# Patient Record
Sex: FEMALE | Race: WHITE
[De-identification: names, ages, dates, MRNs, and addresses within clinical notes are randomized per-mention and may not be internally consistent; named-entity substitution may affect disease eponyms.]

---

## 2018-05-21 ENCOUNTER — HOSPITAL ENCOUNTER (OUTPATIENT)
Dept: HOSPITAL 47 - EC | Age: 48
Setting detail: OBSERVATION
LOS: 2 days | Discharge: HOME | End: 2018-05-23
Payer: COMMERCIAL

## 2018-05-21 DIAGNOSIS — Z79.899: ICD-10-CM

## 2018-05-21 DIAGNOSIS — E66.9: ICD-10-CM

## 2018-05-21 DIAGNOSIS — R07.89: Primary | ICD-10-CM

## 2018-05-21 DIAGNOSIS — Z88.0: ICD-10-CM

## 2018-05-21 DIAGNOSIS — Z82.49: ICD-10-CM

## 2018-05-21 DIAGNOSIS — Z83.3: ICD-10-CM

## 2018-05-21 DIAGNOSIS — E78.1: ICD-10-CM

## 2018-05-21 DIAGNOSIS — Z82.3: ICD-10-CM

## 2018-05-21 DIAGNOSIS — Z86.79: ICD-10-CM

## 2018-05-21 DIAGNOSIS — E55.9: ICD-10-CM

## 2018-05-21 DIAGNOSIS — Z87.01: ICD-10-CM

## 2018-05-21 DIAGNOSIS — K21.9: ICD-10-CM

## 2018-05-21 DIAGNOSIS — I11.9: ICD-10-CM

## 2018-05-21 DIAGNOSIS — Z87.891: ICD-10-CM

## 2018-05-21 DIAGNOSIS — Z80.0: ICD-10-CM

## 2018-05-21 DIAGNOSIS — Z87.820: ICD-10-CM

## 2018-05-21 DIAGNOSIS — E78.5: ICD-10-CM

## 2018-05-21 DIAGNOSIS — Z80.8: ICD-10-CM

## 2018-05-21 LAB
ALBUMIN SERPL-MCNC: 4.4 G/DL (ref 3.5–5)
ALP SERPL-CCNC: 87 U/L (ref 38–126)
ALT SERPL-CCNC: 44 U/L (ref 9–52)
AMYLASE SERPL-CCNC: 65 U/L (ref 30–110)
ANION GAP SERPL CALC-SCNC: 15 MMOL/L
APTT BLD: 25.1 SEC (ref 22–30)
AST SERPL-CCNC: 25 U/L (ref 14–36)
BASOPHILS # BLD AUTO: 0 K/UL (ref 0–0.2)
BASOPHILS NFR BLD AUTO: 0 %
BUN SERPL-SCNC: 11 MG/DL (ref 7–17)
CALCIUM SPEC-MCNC: 10 MG/DL (ref 8.4–10.2)
CHLORIDE SERPL-SCNC: 106 MMOL/L (ref 98–107)
CK SERPL-CCNC: 139 U/L (ref 30–135)
CK SERPL-CCNC: 181 U/L (ref 30–135)
CO2 SERPL-SCNC: 21 MMOL/L (ref 22–30)
D DIMER PPP FEU-MCNC: 0.31 MG/L FEU (ref ?–0.6)
EOSINOPHIL # BLD AUTO: 0.2 K/UL (ref 0–0.7)
EOSINOPHIL NFR BLD AUTO: 3 %
ERYTHROCYTE [DISTWIDTH] IN BLOOD BY AUTOMATED COUNT: 4.49 M/UL (ref 3.8–5.4)
ERYTHROCYTE [DISTWIDTH] IN BLOOD: 12.4 % (ref 11.5–15.5)
GLUCOSE SERPL-MCNC: 120 MG/DL (ref 74–99)
HCT VFR BLD AUTO: 38.6 % (ref 34–46)
HGB BLD-MCNC: 13.2 GM/DL (ref 11.4–16)
INR PPP: 1 (ref ?–1.2)
LIPASE SERPL-CCNC: 94 U/L (ref 23–300)
LYMPHOCYTES # SPEC AUTO: 2.2 K/UL (ref 1–4.8)
LYMPHOCYTES NFR SPEC AUTO: 32 %
MAGNESIUM SPEC-SCNC: 1.9 MG/DL (ref 1.6–2.3)
MCH RBC QN AUTO: 29.5 PG (ref 25–35)
MCHC RBC AUTO-ENTMCNC: 34.2 G/DL (ref 31–37)
MCV RBC AUTO: 86.1 FL (ref 80–100)
MONOCYTES # BLD AUTO: 0.3 K/UL (ref 0–1)
MONOCYTES NFR BLD AUTO: 4 %
NEUTROPHILS # BLD AUTO: 4.1 K/UL (ref 1.3–7.7)
NEUTROPHILS NFR BLD AUTO: 60 %
PLATELET # BLD AUTO: 237 K/UL (ref 150–450)
POTASSIUM SERPL-SCNC: 3.7 MMOL/L (ref 3.5–5.1)
PROT SERPL-MCNC: 6.9 G/DL (ref 6.3–8.2)
PT BLD: 9.8 SEC (ref 9–12)
SODIUM SERPL-SCNC: 142 MMOL/L (ref 137–145)
TROPONIN I SERPL-MCNC: <0.012 NG/ML (ref 0–0.03)
TROPONIN I SERPL-MCNC: <0.012 NG/ML (ref 0–0.03)
WBC # BLD AUTO: 6.8 K/UL (ref 3.8–10.6)

## 2018-05-21 PROCEDURE — 83880 ASSAY OF NATRIURETIC PEPTIDE: CPT

## 2018-05-21 PROCEDURE — 78452 HT MUSCLE IMAGE SPECT MULT: CPT

## 2018-05-21 PROCEDURE — 84484 ASSAY OF TROPONIN QUANT: CPT

## 2018-05-21 PROCEDURE — 96376 TX/PRO/DX INJ SAME DRUG ADON: CPT

## 2018-05-21 PROCEDURE — 80061 LIPID PANEL: CPT

## 2018-05-21 PROCEDURE — 83735 ASSAY OF MAGNESIUM: CPT

## 2018-05-21 PROCEDURE — 96366 THER/PROPH/DIAG IV INF ADDON: CPT

## 2018-05-21 PROCEDURE — 82150 ASSAY OF AMYLASE: CPT

## 2018-05-21 PROCEDURE — 93005 ELECTROCARDIOGRAM TRACING: CPT

## 2018-05-21 PROCEDURE — 99285 EMERGENCY DEPT VISIT HI MDM: CPT

## 2018-05-21 PROCEDURE — 82553 CREATINE MB FRACTION: CPT

## 2018-05-21 PROCEDURE — 96375 TX/PRO/DX INJ NEW DRUG ADDON: CPT

## 2018-05-21 PROCEDURE — 93017 CV STRESS TEST TRACING ONLY: CPT

## 2018-05-21 PROCEDURE — 85379 FIBRIN DEGRADATION QUANT: CPT

## 2018-05-21 PROCEDURE — 82550 ASSAY OF CK (CPK): CPT

## 2018-05-21 PROCEDURE — 83690 ASSAY OF LIPASE: CPT

## 2018-05-21 PROCEDURE — 84443 ASSAY THYROID STIM HORMONE: CPT

## 2018-05-21 PROCEDURE — 85610 PROTHROMBIN TIME: CPT

## 2018-05-21 PROCEDURE — 85025 COMPLETE CBC W/AUTO DIFF WBC: CPT

## 2018-05-21 PROCEDURE — 85730 THROMBOPLASTIN TIME PARTIAL: CPT

## 2018-05-21 PROCEDURE — 36415 COLL VENOUS BLD VENIPUNCTURE: CPT

## 2018-05-21 PROCEDURE — 71046 X-RAY EXAM CHEST 2 VIEWS: CPT

## 2018-05-21 PROCEDURE — 93306 TTE W/DOPPLER COMPLETE: CPT

## 2018-05-21 PROCEDURE — 96365 THER/PROPH/DIAG IV INF INIT: CPT

## 2018-05-21 PROCEDURE — 80053 COMPREHEN METABOLIC PANEL: CPT

## 2018-05-21 RX ADMIN — CEFAZOLIN SCH MLS/HR: 330 INJECTION, POWDER, FOR SOLUTION INTRAMUSCULAR; INTRAVENOUS at 20:16

## 2018-05-21 NOTE — ED
Chest Pain HPI





- General


Chief Complaint: Chest Pain


Stated Complaint: Chest pain


Time Seen by Provider: 18 15:50


Source: patient, RN notes reviewed


Mode of arrival: ambulatory


Limitations: no limitations





- History of Present Illness


Initial Comments: 





This is a 47-year-old female history of hypertension and high cholesterol and 

family history of heart disease who presents with complaints of the onset 3 

days ago of right-sided chest pain sharp in nature 3-4/10 severity now rating 

for the middle of her chest.  She points to the right costal sternal margin.  

She is not recall any heavy lifting and may have precipitated again the pain is 

sharp is been intermittent since 3 days ago today she complains of feeling 

tired all day.  No cough or phlegm production again no overt injury.  No 

personal history of heart disease she did have a physical exam done in March of 

this year family history significant for mother had a myocardial infarction at 

the age of 60 admitted had a AAA diagnosed at age 66.  Patient does not smoke 

cigarettes.


MD Complaint: chest pain





- Related Data


 Home Medications











 Medication  Instructions  Recorded  Confirmed


 


Ascorbic Acid [Vitamin C] 500 mg PO DAILY 18


 


Atorvastatin Calcium [Lipitor] 10 mg PO DAILY 18


 


Biotin 10,000 mcg PO DAILY 18


 


Cholecalciferol (Vitamin D3) 2,000 unit PO DAILY 18





[Vitamin D3]   


 


Escitalopram [Lexapro] 10 mg PO DAILY 18


 


L.acidoph,Paracasei, B.lactis 1 cap PO DAILY 18





[Probiotic]   


 


Lisinopril [Prinivil] 20 mg PO DAILY 18











 Allergies











Allergy/AdvReac Type Severity Reaction Status Date / Time


 


Penicillins Allergy  Unknown Verified 18 16:35














Review of Systems


ROS Statement: 


Those systems with pertinent positive or pertinent negative responses have been 

documented in the HPI.





ROS Other: All systems not noted in ROS Statement are negative.





EKG Findings





- EKG Results:


EKG: interpreted by ROSANNA, sinus rhythm (Sinus rhythm with a rate of 74.  

Interval 144 QRS duration 84 QT since QTC of 390/432 poor R-wave progression 

this does compare however with an EKG dated 3/20/18 and also does correlate 

with one done at work earlier today.)





Past Medical History


Past Medical History: Hypertension


Additional Past Medical History / Comment(s): hypercholestremia


History of Any Multi-Drug Resistant Organisms: None Reported


Past Surgical History: Appendectomy


Additional Past Surgical History / Comment(s): partial hysterectomy.   

x 2


Past Psychological History: No Psychological Hx Reported


Smoking Status: Never smoker


Past Alcohol Use History: Occasional


Past Drug Use History: None Reported





General Exam





- General Exam Comments


Initial Comments: 





This is a well-developed well-nourished awake alert oriented 3 female


Limitations: no limitations


General appearance: alert, in no apparent distress


Head exam: Present: atraumatic, normocephalic, normal inspection


Eye exam: Present: normal appearance, PERRL, EOMI.  Absent: scleral icterus, 

conjunctival injection, periorbital swelling


ENT exam: Present: normal exam, mucous membranes moist


Neck exam: Present: normal inspection.  Absent: tenderness, meningismus, 

lymphadenopathy


Respiratory exam: Present: normal lung sounds bilaterally.  Absent: respiratory 

distress, wheezes, rales, rhonchi, stridor


Cardiovascular Exam: Present: regular rate, normal rhythm, normal heart sounds.

  Absent: systolic murmur, diastolic murmur, rubs, gallop, clicks


GI/Abdominal exam: Present: soft, normal bowel sounds.  Absent: distended, 

tenderness, guarding, rebound, rigid


Extremities exam: Present: normal inspection, full ROM, normal capillary 

refill.  Absent: tenderness, pedal edema, joint swelling, calf tenderness


Back exam: Present: normal inspection


Neurological exam: Present: alert, oriented X3, CN II-XII intact


Psychiatric exam: Present: normal affect, normal mood


Skin exam: Present: warm, dry, intact, normal color.  Absent: rash





Course


 Vital Signs











  18





  16:00 17:25 17:33


 


Temperature 98.4 F  


 


Pulse Rate 75 71 66


 


Pulse Rate [ 63  





Bilateral   





Cardiac Monitor   





]   


 


Respiratory 18 18 16





Rate   


 


Blood Pressure 143/81 135/74 125/70


 


O2 Sat by Pulse 96 97 96





Oximetry   














- Reevaluation(s)


Reevaluation #1: 





18 18:47


The patient persisted having 3/10 chest pain after IV Toradol.  Resolve after 

nitroglycerin.





Chest Pain MDM





- [facility]





Imaging shows no acute findings.  I did discuss findings with patient and her 

.  Patient will be admitted for evaluation of chest pain.  Patient has 

seen Dr. Hinojosa in the past he'll be consulted.  This case with Dr. Hutson





Disposition


Clinical Impression: 


 Unstable angina pectoris, Chest pain





Disposition: ADMITTED AS IP TO THIS HOSP


Condition: Stable


Referrals: 


Nestor Gusman MD [Primary Care Provider] - 1-2 days

## 2018-05-21 NOTE — XR
EXAMINATION TYPE: XR chest 2V

 

DATE OF EXAM: 5/21/2018

 

COMPARISON: NONE

 

HISTORY: Chest pain

 

TECHNIQUE:  Frontal and lateral views of the chest are obtained.

 

FINDINGS:  

 

There is no focal air space opacity.

 

No evidence for pneumothorax.  No pleural effusion.

 

The cardiac silhouette size is within normal limits.

 

The osseous structures are grossly intact.

 

IMPRESSION:  

 

1.  No acute cardiopulmonary process.

## 2018-05-22 VITALS — RESPIRATION RATE: 16 BRPM

## 2018-05-22 LAB
CHOLEST SERPL-MCNC: 174 MG/DL (ref ?–200)
CK SERPL-CCNC: 134 U/L (ref 30–135)
HDLC SERPL-MCNC: 39 MG/DL (ref 40–60)
TRIGL SERPL-MCNC: 690 MG/DL (ref ?–150)
TROPONIN I SERPL-MCNC: <0.012 NG/ML (ref 0–0.03)

## 2018-05-22 RX ADMIN — NITROGLYCERIN SCH: 20 OINTMENT TOPICAL at 06:20

## 2018-05-22 RX ADMIN — CEFAZOLIN SCH: 330 INJECTION, POWDER, FOR SOLUTION INTRAMUSCULAR; INTRAVENOUS at 22:13

## 2018-05-22 RX ADMIN — NITROGLYCERIN SCH: 20 OINTMENT TOPICAL at 02:41

## 2018-05-22 NOTE — EST
EXERCISE STRESS



DATE OF SERVICE:

05/22/2018



AGE:

47



SEX:

Female



HT:

64



WT:

230



PROTOCOL:

Sebastien



STAGE:

II



DURATION OF EXERCISE:

6 minutes



HEART RATE REST:

81



BLOOD PRESSURE REST:

162/111



MAXIMUM HEART RATE ACHIEVED:

158



MAXIMUM BLOOD PRESSURE:

265/83



85% MPHR:

148



100% MPHR:

174



METS:

7.3



INDICATION OF THE STUDY:

Chest pain.



CLINICAL INFORMATION:

STRESS DATA:  Pretesting physical examination showed a heart rate of 81, pressure is

162/111 mmHg.  Baseline EKG showed sinus mechanism.  The patient exercised on the

treadmill according to Sebastien protocol for a total of 6 minutes and achieved 7.3 METs.

Max heart rate was 158 which is about 91% of maximum predicted heart rate.  Maximum

blood pressure was 265/83 mmHg. Clinically the patient developed chest discomfort at

the peak of the exercise as well as on recovery.  The EKG did not show any significant

ST or T-wave abnormalities consistent with ischemia.



CONCLUSION:

1. Good exercise tolerance.

2. Chest discomfort in response to exercise.

3. Mildly abnormal stress test in view of the chest discomfort at the peak of the

    exercise as well as on recovery.





MMODL / IJN: [id number] / Job#: [id number]

## 2018-05-22 NOTE — P.HPIM
History of Present Illness


H&P Date: 18


Chief Complaint: Chest pain





HISTORY AND PHYSICAL AND DISCHARGE SUMMARY: 








This is a 47-year-old  female patient of Dr. Gusman with past medical 

history of hyperlipidemia, gastroesophageal reflux disease, hypertension.  She 

also gives history that she had palpitations while she was pregnant and 

followed with Dr. Hinojosa at that time.  She has had a previous stress test that 

was negative.  Patient gives history that she has had chest pain the right 

sternal border for a couple of days.  No radiation to her arms.  Yesterday she 

woke up with the pain which seemed to be worse.  She denies any increased pain 

with deep breathing.  No worsening pain with exertion.  She does state she was 

sweaty at the time.  No nausea.  She came in to [facility] 

emergency center for evaluation.  Avoidance of been negative on 3 draws.  

Triglycerides 690, cholesterol 174, she'll 39.  Normal white count, hemoglobin 

13.2, amylase and lipase were normal.  Blood sugar was 120.  TSH 2.930.  EKG 

was a sinus rhythm with no acute ST changes.  Patient was started on aspirin, 

heparin drip and placed on the observation unit with consult with cardiology.  

Patient is scheduled for exercise stress echocardiogram.  Patient believes pain 

is gone at this time because of taking nitroglycerin.





Echocardiogram reveals mild concentric left ventricular hypertrophy, EF 50-55%, 

trace mitral regurgitation, trace tricuspid regurgitation.





Review of Systems


All systems: negative


Constitutional: Denies anorexia, Denies chills, Denies fatigue, Denies fever, 

Denies poor appetite, Denies weakness, Denies weight gain, Denies weight loss


Eyes: denies blurred vision, denies pain


Ears, nose, mouth and throat: Denies headache, Denies sore throat, Denies 

vertigo


Cardiovascular: Denies chest pain, Denies decreased exercise tolerance, Denies 

dyspnea on exertion, Denies edema, Denies leg edema, Denies lightheadedness, 

Denies orthopnea, Denies palpitations, Denies paroxysmal nocturnal dyspnea, 

Denies rapid heart beat, Denies shortness of breath, Denies syncope


Respiratory: Denies cough, Denies cough with sputum, Denies dyspnea, Denies 

excessive sputum, Denies wheezing


Gastrointestinal: Denies abdominal pain, Denies diarrhea, Denies nausea, Denies 

vomiting


Genitourinary: Denies dysuria, Denies hematuria


Musculoskeletal: Denies frequent falls, Denies gait dysfunction, Denies myalgias


Integumentary: Denies pruritus, Denies rash, Denies wounds


Neurological: Denies confusion, Denies numbness, Denies weakness


Psychiatric: Denies anxiety, Denies depression


Endocrine: Reports excessive sweating, Denies fatigue, Denies weight change





Past Medical History


Past Medical History: GERD/Reflux, Hyperlipidemia, Hypertension, Pneumonia


Additional Past Medical History / Comment(s): Palpitations w/pregnancies, 

concussion d/t fall on ice in 2018


History of Any Multi-Drug Resistant Organisms: None Reported


Past Surgical History: Appendectomy


Additional Past Surgical History / Comment(s): partial hysterectomy.   

x 2


Past Anesthesia/Blood Transfusion Reactions: Motion Sickness, Postoperative 

Nausea & Vomiting (PONV)


Smoking Status: Former smoker


Additional Past Alcohol Use History / Comment(s): Patient was a smoker of one 

pack per day from age 14 and quit in .  She drink alcohol occasionally.  

She denies any marijuana or street drug use.  She is  and lives at home 

with her .  She works [employer].





- Past Family History


  ** Father


Family Medical History: Cancer, Hyperlipidemia, Hypertension


Additional Family Medical History / Comment(s): Father  at age 75 from 

pancreatic cancer.  AAA,





  ** Mother


Family Medical History: Cancer, CVA/TIA, Diabetes Mellitus, Hyperlipidemia, 

Hypertension, Myocardial Infarction (MI), Thyroid Disorder


Additional Family Medical History / Comment(s): Mother  from C. difficile 

colitis, history of CVA X2,THYROID CANCER, BRAIN ANUERYSM





  ** Brother(s)


Additional Family Medical History / Comment(s): Patient has 2 brothers and 1 

sister.  One brother and one sister have atrial fibrillation.  Now 100 artery 

disease.  Patient has 2 children with no major medical problems.





Medications and Allergies


 Home Medications











 Medication  Instructions  Recorded  Confirmed  Type


 


Ascorbic Acid [Vitamin C] 500 mg PO DAILY 18 History


 


Atorvastatin Calcium [Lipitor] 10 mg PO DAILY 18 History


 


Biotin 10,000 mcg PO DAILY 18 History


 


Cholecalciferol (Vitamin D3) 2,000 unit PO DAILY 18 History





[Vitamin D3]    


 


Escitalopram [Lexapro] 10 mg PO DAILY 18 History


 


L.acidoph,Paracasei, B.lactis 1 cap PO DAILY 18 History





[Probiotic]    


 


Lisinopril [Prinivil] 20 mg PO DAILY 18 History











 Allergies











Allergy/AdvReac Type Severity Reaction Status Date / Time


 


Penicillins Allergy  Unknown Verified 18 16:35














Physical Exam


Vitals: 


 Vital Signs











  Temp Pulse Pulse Pulse Resp BP BP


 


 18 07:30  98.0 F    63  18   112/57


 


 18 04:00    60   16  


 


 18 03:50  98.1 F   60   16   92/50


 


 18 00:00      16  


 


 18 23:28  98.0 F   65   16   138/62


 


 18 21:12  97.9 F   69   16   128/56


 


 18 20:54  98.2 F  61    18  133/60 


 


 18 20:39   61    16  129/72 


 


 18 19:46   70    18  130/72 


 


 18 18:46  97.8 F  65    16  132/77 


 


 18 18:16  97.6 F  66    16  126/78 


 


 18 17:46  97.8 F  68    18  134/74 


 


 18 17:33   66    16  125/70 


 


 18 17:25   71    18  135/74 


 


 18 16:00  98.4 F  75  63   18  143/81 














  Pulse Ox


 


 18 07:30  97


 


 18 04:00 


 


 18 03:50  98


 


 18 00:00 


 


 18 23:28  97


 


 18 21:12  97


 


 18 20:54  98


 


 18 20:39  98


 


 18 19:46  98


 


 18 18:46  99


 


 18 18:16  99


 


 18 17:46  98


 


 18 17:33  96


 


 18 17:25  97


 


 18 16:00  96








 Intake and Output











 18





 22:59 06:59 14:59


 


Intake Total  189.333 


 


Balance  189.333 


 


Intake:   


 


  Intake, IV Titration  189.333 





  Amount   


 


    Heparin Sodium,Porcine/  189.333 





    D5w Pmx 25,000 unit In   





    Dextrose/Water 1 500ml.   





    bag @ 9.586 UNITS/KG/HR   





    20 mls/hr IV .Q24H [facility] Rx   





    #:[id number]   


 


Other:   


 


  Voiding Method  Toilet 


 


  # Voids  1 


 


  Weight 104.326 kg  

















Gen: This is a 47-year-old obese  female.  She is resting in bed on 

the observation unit and appears to be comfortable and in no acute distress.  

No respiratory distress noted.


HEENT: Head is atraumatic, normocephalic. Pupils equal, round. Sclerae is 

anicteric.  Conjunctiva pink.  Mucous members of the mouth are moist.


NECK: Supple. No JVD. No lymphadenopathy. No thyromegaly. 


LUNGS: Clear to auscultation. No wheezes or rhonchi.  No intercostal 

retractions.


HEART: Regular rate and rhythm. No murmur.  No chest tenderness with palpation.


ABDOMEN: Soft. Bowel sounds are present. No masses.  No tenderness.


EXTREMITIES: No pedal edema.  No calf tenderness.  Dorsalis pedis +2 

bilaterally.


NEUROLOGICAL: Patient is awake, alert and oriented x3. Cranial nerves 2 through 

12 are grossly intact. 








Results


CBC & Chem 7: 


 18 16:15





 18 16:15


Labs: 


 Abnormal Lab Results - Last 24 Hours (Table)











  18 Range/Units





  16:15 16:15 22:37 


 


Carbon Dioxide  21 L    (22-30)  mmol/L


 


Glucose  120 H    (74-99)  mg/dL


 


Total Creatine Kinase   181 H  139 H  ()  U/L


 


Triglycerides     (<150)  mg/dL


 


HDL Cholesterol     (40-60)  mg/dL














  18 Range/Units





  03:03 


 


Carbon Dioxide   (22-30)  mmol/L


 


Glucose   (74-99)  mg/dL


 


Total Creatine Kinase   ()  U/L


 


Triglycerides  690 H  (<150)  mg/dL


 


HDL Cholesterol  39 L  (40-60)  mg/dL














Thrombosis Risk Factor Assmnt





- DVT/VTE Prophylaxis


DVT/VTE Prophylaxis: Pharmacologic Prophylaxis ordered





- Choose All That Apply


Each Factor Represents 1 point: Age 41-60 years, Obesity (BMI >25)


Thrombosis Risk Factor Assessment Total Risk Factor Score: 2


Thrombosis Risk Factor Assessment Level: Low Risk





Assessment and Plan


Plan: 





1.  Chest pain with negative troponins.  Exercise stress echocardiogram.  

Cardiology consult appreciated.  Chest pain possibly related to gastric reflux 

and or musculoskeletal pain.  Patient will be placed on Aleve and omeprazole 

for 2 weeks





2.  Hyperlipidemia.  Continue atorvastatin, hypertriglyceridemia.





3.  Hypertension.  Continue lisinopril 20 mg daily.





4.  Gastroesophageal reflux disease.





5.  Vitamin D deficiency.  Continue supplement.





Patient placed on the observation unit.





Discharge plan: Return home





Impression and plan of care have been directed as dictated by the signing 

physician.  Sallie Machado nurse practitioner acting as scribe for signing 

physician.

## 2018-05-22 NOTE — ECHOF
Referral Reason:Chest pain



MEASUREMENTS

--------

HEIGHT: 162.6 cm

WEIGHT: 104.3 kg

BP: 138/62

IVSd:   1.3 cm     (0.6 - 1.1)

LVIDd:   3.4 cm     (3.9 - 5.3)

LVPWd:   1.2 cm     (0.6 - 1.1)

IVSs:   1.8 cm

LVIDs:   1.6 cm

LVPWs:   1.8 cm

Ao Diam:   3.2 cm     (2.0 - 3.7)

AV Cusp:   2.2 cm     (1.5 - 2.6)

LA Diam:   3.7 cm     (2.7 - 3.8)

MV EXCURSION:   14.577 mm     (> 18.000)

MV EF SLOPE:   130 mm/s     (70 - 150)

EPSS:   0.3 cm

MV E Rao:   0.78 m/s

MV DecT:   166 ms

MV A Rao:   0.54 m/s

MV E/A Ratio:   1.44 

RAP:   5.00 mmHg

RVSP:   22.41 mmHg







FINDINGS

--------

Sinus rhythm.

This was a technically difficult study with suboptimal views.

The left ventricular size is normal.   There is mild concentric left ventricular hypertrophy.   Overa
ll left ventricular systolic function is low-normal with, an EF between 50 - 55 %.

The right ventricle is normal in size and function.

The left atrium is normal in size.

The right atrium is normal in size.

Lumason used

The aortic valve is trileaflet, and appears structurally normal. No aortic stenosis or regurgitation.


Mild mitral annular calcification present.   There is trace mitral regurgitation.

Trace tricuspid regurgitation present.   The right ventricular systolic pressure, as measured by Dopp
ler, is 22.41mmHg.

Pulmonic valve appears structurally normal.

The aortic root size is normal.

The pericardium is normal.



CONCLUSIONS

--------

1. Sinus rhythm.

2. This was a technically difficult study with suboptimal views.

3. The left ventricular size is normal.

4. There is mild concentric left ventricular hypertrophy.

5. Overall left ventricular systolic function is low-normal with, an EF between 50 - 55 %.

6. The right ventricle is normal in size and function.

7. The left atrium is normal in size.

8. The right atrium is normal in size.

9. Lumason used

10. The aortic valve is trileaflet, and appears structurally normal. No aortic stenosis or regurgitat
ion.

11. Mild mitral annular calcification present.

12. There is trace mitral regurgitation.

13. Trace tricuspid regurgitation present.

14. The right ventricular systolic pressure, as measured by Doppler, is 22.41mmHg.

15. Pulmonic valve appears structurally normal.

16. The aortic root size is normal.

17. The pericardium is normal.





SONOGRAPHER: Adali Turk RDCS

## 2018-05-22 NOTE — P.CRDCN
History of Present Illness


History of present illness: 


Mrs. Ruiz is a pleasant 47-year-old  female past medical history 

significant for hypertension, gastroesophageal reflux disease, supraventricular 

tachycardia with pregnancies, former tobacco use and dyslipidemia.  She denies 

history of coronary artery disease.  We have been asked to see her in 

consultation for chest pain. She states she has had pain to the right anterior 

chest wall that started on Saturday, it was intermittent in nature with no 

radiation to arm, back, neck or jaw. No associated symptoms such as shortness 

of breath, nausea, vomiting, dizziness, palpitations or diaphoresis. Pain 

persisted intermittently on  again with no specific aggravating or 

alleviating factors. When she woke up Monday morning the pain was again evident 

and  persisted all day. She decided to come to hospital for evaluation. Once 

she arrived she was given SL nitroglycerin x1 and the pain has resolved 

completely. No reoccurrence of pain since admission. 





EKG reveals sinus mechanism with no acute ST or T-wave abnormalities.


Chest x-ray is negative for an acute cardiopulmonary process.


Laboratory data reviewed, hemoglobin 13.2, platelets 237, d-dimer 0.31, sodium 

142, potassium 3.7, magnesium 1.9, creatinine 0.63, cardiac enzymes negative 3

, triglycerides 690, HDL 39, total cholesterol 174, TSH 2.93.


Current cardiac medications include atorvastatin 10 mg daily, lisinopril 20 mg 

daily.  She also takes probiotic, vitamin D, biotin, vitamin C and Lexapro.





Review of Systems





At the time of my exam:


CONSTITUTIONAL: Denies fever. Denies chills.


EYES: Denies blurred vision. Denies vision changes. Denies eye pain.


EARS, NOSE, MOUTH & THROAT: Denies headache. Denies sore throat. Denies ear 

pain.


CARDIOVASCULAR: Denies chest pain. Denies shortness of breath. Denies 

orthopnea. Denies PND. Denies palpitations.


RESPIRATORY: Denies cough. 


GASTROINTESTINAL: Denies abdominal pain. Denies diarrhea. Denies constipation. 

Denies nausea. Denies vomiting.


MUSCULOSKELETAL: Denies myalgias.


INTEGUMENTARY: Denies pruitis. Denies rash.


NEUROLOGIC: Denies numbness. Denies tingling. Denies weakness.


PSYCHIATRIC: Denies anxiety. Denies depression.


ENDOCRINE: Denies fatigue. Denies weight change. Denies polydipsia. Denies 

polyurina.


GENITOURINARY: Denies burning, hematuria or urgency with micturation.


HEMATOLOGIC: Denies history of anemia. Denies bleeding. 








Past Medical History


Past Medical History: GERD/Reflux, Hypertension, Pneumonia


Additional Past Medical History / Comment(s): hypercholestremia.  in past w/

pregnancies had palpitations, concussion d/t fall on ice in 2018


History of Any Multi-Drug Resistant Organisms: None Reported


Past Surgical History: Appendectomy


Additional Past Surgical History / Comment(s): partial hysterectomy.   

x 2


Past Anesthesia/Blood Transfusion Reactions: Motion Sickness, Postoperative 

Nausea & Vomiting (PONV)


Smoking Status: Former smoker





- Past Family History


  ** Father


Family Medical History: Cancer, Hyperlipidemia, Hypertension


Additional Family Medical History / Comment(s): AAA,





  ** Mother


Family Medical History: Cancer, CVA/TIA, Diabetes Mellitus, Hyperlipidemia, 

Hypertension, Myocardial Infarction (MI), Thyroid Disorder


Additional Family Medical History / Comment(s): CVA X2,THYROID CANCER, BRAIN 

ANUERYSM





Medications and Allergies


 Home Medications











 Medication  Instructions  Recorded  Confirmed  Type


 


Ascorbic Acid [Vitamin C] 500 mg PO DAILY 18 History


 


Atorvastatin Calcium [Lipitor] 10 mg PO DAILY 18 History


 


Biotin 10,000 mcg PO DAILY 18 History


 


Cholecalciferol (Vitamin D3) 2,000 unit PO DAILY 18 History





[Vitamin D3]    


 


Escitalopram [Lexapro] 10 mg PO DAILY 18 History


 


L.acidoph,Paracasei, B.lactis 1 cap PO DAILY 18 History





[Probiotic]    


 


Lisinopril [Prinivil] 20 mg PO DAILY 18 History











 Allergies











Allergy/AdvReac Type Severity Reaction Status Date / Time


 


Penicillins Allergy  Unknown Verified 18 16:35














Physical Exam


Vitals: 


 Vital Signs











  Temp Pulse Pulse Pulse Resp BP BP


 


 18 07:30  98.0 F    63  18   112/57


 


 18 04:00    60   16  


 


 18 03:50  98.1 F   60   16   92/50


 


 18 00:00      16  


 


 18 23:28  98.0 F   65   16   138/62


 


 18 21:12  97.9 F   69   16   128/56


 


 18 20:54  98.2 F  61    18  133/60 


 


 18 20:39   61    16  129/72 


 


 18 19:46   70    18  130/72 


 


 18 18:46  97.8 F  65    16  132/77 


 


 18 18:16  97.6 F  66    16  126/78 


 


 18 17:46  97.8 F  68    18  134/74 


 


 18 17:33   66    16  125/70 


 


 18 17:25   71    18  135/74 


 


 18 16:00  98.4 F  75  63   18  143/81 














  Pulse Ox


 


 18 07:30  97


 


 18 04:00 


 


 18 03:50  98


 


 18 00:00 


 


 18 23:28  97


 


 18 21:12  97


 


 18 20:54  98


 


 18 20:39  98


 


 18 19:46  98


 


 18 18:46  99


 


 18 18:16  99


 


 18 17:46  98


 


 18 17:33  96


 


 18 17:25  97


 


 18 16:00  96








 Intake and Output











 18





 22:59 06:59 14:59


 


Intake Total  189.333 


 


Balance  189.333 


 


Intake:   


 


  Intake, IV Titration  189.333 





  Amount   


 


    Heparin Sodium,Porcine/  189.333 





    D5w Pmx 25,000 unit In   





    Dextrose/Water 1 500ml.   





    bag @ 9.586 UNITS/KG/HR   





    20 mls/hr IV .Q24H [facility] Rx   





    #:[id number]   


 


Other:   


 


  Voiding Method  Toilet 


 


  # Voids  1 


 


  Weight 104.326 kg  














Blood pressure 112/57 heart rate 63 afebrile maintaining oxygen saturation on 

room air


GENERAL: This is a 47-year-old  female in no apparent distress at the 

time of my examination.  Obese.


HEENT: Head is atraumatic, normocephalic. Pupils are equal, round. Sclerae 

anicteric. Conjunctivae are clear. Mucous membranes of the mouth are moist. 

Neck is supple. There is no jugular venous distention. No carotid bruit is 

heard.


LUNGS: Clear to auscultation no wheezes, rales or rhonchi. No chest wall 

tenderness is noted on palpation or with deep breathing.


HEART: Regular rate and rhythm without murmurs, rubs or gallops. S1 and S2 

heard.


ABDOMEN: Soft, nontender. Bowel sounds are heard. No organomegaly noted.


EXTREMITIES: No evidence of peripheral edema and no calf tenderness noted.


VASCULAR: Radial and dorsalis pedis pulses palpated, no evidence of clubbing.  


NEUROLOGIC: Patient is awake, alert and oriented x3.


 








Results





 18 16:15





 18 16:15


 Cardiac Enzymes











  18 Range/Units





  16:15 16:15 22:37 


 


AST  25    (14-36)  U/L


 


CK-MB (CK-2)   1.8  1.2  (0.0-2.4)  ng/mL


 


Troponin I   <0.012  <0.012  (0.000-0.034)  ng/mL














  18 Range/Units





  03:03 


 


AST   (14-36)  U/L


 


CK-MB (CK-2)  1.1  (0.0-2.4)  ng/mL


 


Troponin I  <0.012  (0.000-0.034)  ng/mL








 Coagulation











  18 Range/Units





  16:15 03:03 


 


PT  9.8   (9.0-12.0)  sec


 


APTT  25.1  27.3  (22.0-30.0)  sec








 Lipids











  18 Range/Units





  03:03 


 


Triglycerides  690 H  (<150)  mg/dL


 


Cholesterol  174  (<200)  mg/dL


 


HDL Cholesterol  39 L  (40-60)  mg/dL








 CBC











  18 Range/Units





  16:15 


 


WBC  6.8  (3.8-10.6)  k/uL


 


RBC  4.49  (3.80-5.40)  m/uL


 


Hgb  13.2  (11.4-16.0)  gm/dL


 


Hct  38.6  (34.0-46.0)  %


 


Plt Count  237  (150-450)  k/uL








 Comprehensive Metabolic Panel











  18 Range/Units





  16:15 


 


Sodium  142  (137-145)  mmol/L


 


Potassium  3.7  (3.5-5.1)  mmol/L


 


Chloride  106  ()  mmol/L


 


Carbon Dioxide  21 L  (22-30)  mmol/L


 


BUN  11  (7-17)  mg/dL


 


Creatinine  0.63  (0.52-1.04)  mg/dL


 


Glucose  120 H  (74-99)  mg/dL


 


Calcium  10.0  (8.4-10.2)  mg/dL


 


AST  25  (14-36)  U/L


 


ALT  44  (9-52)  U/L


 


Alkaline Phosphatase  87  ()  U/L


 


Total Protein  6.9  (6.3-8.2)  g/dL


 


Albumin  4.4  (3.5-5.0)  g/dL








 Current Medications











Generic Name Dose Route Start Last Admin





  Trade Name Freq  PRN Reason Stop Dose Admin


 


Ascorbic Acid  500 mg  18 09:00  





  Vitamin C  PO   





  DAILY [facility]   


 


Aspirin  325 mg  18 09:00  





  Aspirin  PO   





  DAILY [facility]   


 


Atorvastatin Calcium  10 mg  18 09:00  





  Lipitor  PO   





  DAILY [facility]   


 


Cholecalciferol  2,000 unit  18 09:00  





  Vitamin D3  PO   





  DAILY [facility]   


 


Escitalopram Oxalate  10 mg  18 09:00  





  Lexapro  PO   





  DAILY [facility]   


 


Heparin Sodium (Porcine)  0 unit  18 05:14  18 05:38





  Heparin  IV   4,000 unit





  PER PROTOCOL PRN   Administration





  Low PTT   





  Protocol   


 


Heparin Sodium/Dextrose 25,000  500 mls @ 20 mls/hr  18 18:45  18 05

:40





  unit/ IV Solution  IV   12.6 units/kg/hr





  .Q24H [facility]   26.3 mls/hr





  Protocol   Titration





  9.586 UNITS/KG/HR   


 


Sodium Chloride  1,000 mls @ 20 mls/hr  18 18:45  18 20:16





  Saline 0.9%  IV   20 mls/hr





  .Q24H [facility]   Administration


 


Lactobacillus Acidoph/Bulgaricus  1 each  18 09:00  





  Lactinex  PO   





  DAILY [facility]   


 


Lisinopril  20 mg  18 09:00  





  Zestril  PO   





  DAILY [facility]   


 


Nitroglycerin  1 inch  18 00:00  18 06:20





  Nitro-Bid Oint  TOPICAL   Not Given





  Q6HR [facility]   


 


Nitroglycerin  0.4 mg  05/21/18 18:42  





  Nitrostat  SUBLINGUAL   





  Q5M PRN   





  Chest Pain   








 Intake and Output











 18





 22:59 06:59 14:59


 


Intake Total  189.333 


 


Balance  189.333 


 


Intake:   


 


  Intake, IV Titration  189.333 





  Amount   


 


    Heparin Sodium,Porcine/  189.333 





    D5w Pmx 25,000 unit In   





    Dextrose/Water 1 500ml.   





    bag @ 9.586 UNITS/KG/HR   





    20 mls/hr IV .Q24H [facility] Rx   





    #:[id number]   


 


Other:   


 


  Voiding Method  Toilet 


 


  # Voids  1 


 


  Weight 104.326 kg  








 





 18 16:15 





 18 16:15 











Assessment and Plan


Assessment: 





ASSESSMENT


1.  Chest pain, atypical.  An acute coronary event has been ruled out with no 

EKG evidence of ischemia and normal cardiac enzymes.


2.  Hypertension, controlled on lisinopril


3.  Dyslipidemia on atorvastatin


4.  Hypertriglyceridemia


5.  Obesity





PLAN


An acute coronary event has been ruled out.


Perform exercise stress test to assess for stress-induced EKG changes.


Stress test is normal she is stable from a cardiac perspective.


Follow-up with Dr. Barnett in 2-3 weeks.


Thank you kindly for this consultation.





Nurse Practitioner note has been reviewed, I agree with a documented findings 

and plan of care.  Patient was seen and examined.

## 2018-05-23 VITALS — HEART RATE: 62 BPM | TEMPERATURE: 97.8 F | DIASTOLIC BLOOD PRESSURE: 57 MMHG | SYSTOLIC BLOOD PRESSURE: 109 MMHG

## 2018-05-23 NOTE — EST
EXERCISE STRESS



DATE OF SERVICE:

05/23/2018



AGE:

47



SEX:

F



HT:

5'4"



WT:

230



PROTOCOL:

Lexiscan Cardiolite Stress Test



HEART RATE REST:

64



BLOOD PRESSURE REST:

117/80



MAXIMUM HEART RATE ACHIEVED:

89



MAXIMUM BLOOD PRESSURE:

118/72



85% MPHR:

148



100% MPHR:

174



INDICATIONS:

Chest pain.



CLINICAL INFORMATION:

STRESS DATA:  Pretesting physical examination showed a heart rate of 64, pressure is

117/80 mmHg.  Baseline ECG shows sinus mechanism, 0.4 mg of Lexiscan was given to the

patient over 15 seconds per protocol.  Max heart rate was 89 beats per minute and

maximum pressure was 118/72 mmHg.  Clinically. the patient did not have any symptoms of

chest pain and the EKG did not show any significant ST or T-wave abnormality.



CONCLUSION:

1. Nondiagnostic electrocardiograph stress test in response to Lexiscan.

2. Please follow up on the Cardiolite portion.





MMODL / IJN: [id number] / Job#: [id number]

## 2018-05-23 NOTE — NM
EXAMINATION TYPE: NM stress lexiscan cardiolite

 

DATE OF EXAM: 5/23/2018

 

COMPARISON: NONE

 

HISTORY: Chest pain

 

TECHNIQUE:  After the intravenous administration of 10 mCi Tc 99m Sestamibi - Cardiolite resting SPEC
T images acquired 45 minutes post injection. 

 

The patient received 0.4mg Lexiscan, 24.8 mCi Tc 99m Sestamibi - Stress images obtained 45 minutes po
st injection 

 

FINDINGS:  

 

Review of stress and rest SPECT images demonstrates no distinct perfusion abnormality.  Gated analysi
s shows normal wall motion with an estimated left ventricular ejection fraction of 62 %.

 

 

 

IMPRESSION:  

 

No scintigraphic evidence for reversible ischemia.

## 2018-05-23 NOTE — P.PN
Subjective





Mrs. Ruiz is a pleasant 47-year-old  female past medical history 

significant for hypertension, gastroesophageal reflux disease, supraventricular 

tachycardia with pregnancies, former tobacco use and dyslipidemia.  She denies 

history of coronary artery disease.  We have been asked to see her in 

consultation for chest pain. She states she has had pain to the right anterior 

chest wall that started on Saturday, it was intermittent in nature with no 

radiation to arm, back, neck or jaw. No associated symptoms such as shortness 

of breath, nausea, vomiting, dizziness, palpitations or diaphoresis. Pain 

persisted intermittently on Sunday again with no specific aggravating or 

alleviating factors. When she woke up Monday morning the pain was again evident 

and  persisted all day. She decided to come to hospital for evaluation. Once 

she arrived she was given SL nitroglycerin x1 and the pain has resolved 

completely. No reoccurrence of pain since admission. 





EKG reveals sinus mechanism with no acute ST or T-wave abnormalities.


Chest x-ray is negative for an acute cardiopulmonary process.


Laboratory data reviewed, hemoglobin 13.2, platelets 237, d-dimer 0.31, sodium 

142, potassium 3.7, magnesium 1.9, creatinine 0.63, cardiac enzymes negative 3

, triglycerides 690, HDL 39, total cholesterol 174, TSH 2.93.


Current cardiac medications include atorvastatin 10 mg daily, lisinopril 20 mg 

daily.  She also takes probiotic, vitamin D, biotin, vitamin C and Lexapro.





5/23/2018


Mrs. Ruiz underwent an exercise stress test yesterday. In recovery she developed 

symptoms of chest pain similar to how she felt prior to admission. There was no 

EKG evidence of ischemia. We have scheduled her for a Lexiscan stress test 

today for further evaluation. She denies any further symptoms of chest pain 

overnight. Telemetry tracings have been unremarkable. Blood pressure 109/57 

heart rate 62 afebrile and maintaining oxygen saturation on room air. 





Objective





- Vital Signs


Vital signs: 


 Vital Signs











Temp  97.8 F   05/23/18 08:00


 


Pulse  62   05/23/18 08:00


 


Resp  16   05/23/18 08:00


 


BP  109/57   05/23/18 08:00


 


Pulse Ox  98   05/23/18 08:00








 Intake & Output











 05/22/18 05/23/18 05/23/18





 18:59 06:59 18:59


 


Intake Total 920  


 


Balance 920  


 


Weight 104.326 kg  


 


Intake:   


 


  Oral 720  


 


  Other 200  


 


Other:   


 


  Voiding Method  Toilet Toilet


 


  # Voids  1 














- Exam





GENERAL: Well-appearing, well-nourished and in no acute distress.


NECK: Supple without JVD or thyromegaly.


LUNGS: Breath sounds clear to auscultation bilaterally. Respiration equal and 

unlabored.  No wheezes, rales or rhonchi.


HEART: Regular rate and rhythm without murmurs, rubs or gallops. S1 and S2 

heard.


EXTREMITIES: Normal range of motion, no edema.  No clubbing or cyanosis. 

Peripheral pulses intact and strong.








- Labs


CBC & Chem 7: 


 05/21/18 16:15





 05/21/18 16:15





Assessment and Plan


Assessment: 





ASSESSMENT


1.  Chest pain, atypical.  An acute coronary event has been ruled out with no 

EKG evidence of ischemia and normal cardiac enzymes.


2.  Hypertension, controlled on lisinopril


3.  Dyslipidemia on atorvastatin


4.  Hypertriglyceridemia


5.  Obesity





PLAN


Perform Lexiscan stress test to assess for reversible cardiac ischemia. 


If this is negative she is stable from a cardiac perspective. 





Nurse Practitioner note has been reviewed, I agree with a documented findings 

and plan of care.  Patient was seen and examined.

## 2018-05-24 NOTE — P.DS
Providers


Date of admission: 


05/21/18 18:42





Expected date of discharge: 05/23/18


Attending physician: 


John Hutson





Consults: 





 





05/21/18 18:42


Consult Physician Urgent 


   Consulting Provider: Alan Hinojosa


   Consult Reason/Comments: Chest pain


   Do you want consulting provider notified?: Yes











Primary care physician: 


Nestor Gusman





[facility]: 





This is a 47-year-old  female patient of Dr. Gusman with past medical 

history of hyperlipidemia, gastroesophageal reflux disease, hypertension.  She 

also gives history that she had palpitations while she was pregnant and 

followed with Dr. Hinojosa at that time.  She has had a previous stress test that 

was negative.  Patient gives history that she has had chest pain the right 

sternal border for a couple of days.  No radiation to her arms.  Yesterday she 

woke up with the pain which seemed to be worse.  She denies any increased pain 

with deep breathing.  No worsening pain with exertion.  She does state she was 

sweaty at the time.  No nausea.  She came in to [facility] 

emergency center for evaluation.  Avoidance of been negative on 3 draws.  

Triglycerides 690, cholesterol 174, she'll 39.  Normal white count, hemoglobin 

13.2, amylase and lipase were normal.  Blood sugar was 120.  TSH 2.930.  EKG 

was a sinus rhythm with no acute ST changes.  Patient was started on aspirin, 

heparin drip and placed on the observation unit with consult with cardiology.  

Patient is scheduled for exercise stress echocardiogram.  Patient believes pain 

is gone at this time because of taking nitroglycerin.





Echocardiogram reveals mild concentric left ventricular hypertrophy, EF 50-55%, 

trace mitral regurgitation, trace tricuspid regurgitation.


Triglycerides 690, cholesterol 174, HDL 39.





5/23: Patient underwent exercise stress test yesterday but developed symptoms 

of chest pain after it was over.  There were no EKG changes.  She was then 

scheduled for Lexiscan stress test today which came back negative and patient 

was cleared for discharge by cardiology.  Patient will be discharged home today 

in stable condition.








Discharge diagnoses:


1.  Chest pain with negative troponins.  


2.  Hyperlipidemia.  


3.  Hypertension.  


4.  Gastroesophageal reflux disease.


5.  Vitamin D deficiency.  





Discharge plan: Return home





Impression and plan of care have been directed as dictated by the signing 

physician.  Sallie Machado nurse practitioner acting as scribe for signing 

physician.








Patient Condition at Discharge: Good





Plan - Discharge Summary


Discharge Rx Participant: No


New Discharge Prescriptions: 


New


   Naproxen Sodium [Aleve] 220 mg PO Q12HR #28 tab


   RX: Omeprazole 20 mg PO DAILY #14 tablet.





No Action


   Ascorbic Acid [Vitamin C] 500 mg PO DAILY


   Escitalopram [Lexapro] 10 mg PO DAILY


   Lisinopril [Prinivil] 20 mg PO DAILY


   L.acidoph,Paracasei, B.lactis [Probiotic] 1 cap PO DAILY


   Cholecalciferol (Vitamin D3) [Vitamin D3] 2,000 unit PO DAILY


   RX: Biotin 10,000 mcg PO DAILY


   Atorvastatin Calcium [Lipitor] 10 mg PO DAILY


Discharge Medication List





Ascorbic Acid [Vitamin C] 500 mg PO DAILY 05/21/18 [History]


Atorvastatin Calcium [Lipitor] 10 mg PO DAILY 05/21/18 [History]


Cholecalciferol (Vitamin D3) [Vitamin D3] 2,000 unit PO DAILY 05/21/18 [History]


Escitalopram [Lexapro] 10 mg PO DAILY 05/21/18 [History]


L.acidoph,Paracasei, B.lactis [Probiotic] 1 cap PO DAILY 05/21/18 [History]


Lisinopril [Prinivil] 20 mg PO DAILY 05/21/18 [History]


RX: Biotin 10,000 mcg PO DAILY 05/21/18 [History]


Naproxen Sodium [Aleve] 220 mg PO Q12HR #28 tab 05/22/18 [Rx]


RX: Omeprazole 20 mg PO DAILY #14 tablet. 05/22/18 [Rx]








Follow up Appointment(s)/Referral(s): 


Nestor Gusman MD [Primary Care Provider] - 05/30/18 11:00 am (Patient to see 

LUCRECIA Thakur in office)


Evan Barnett MD [STAFF PHYSICIAN] - 06/05/18 4:45 pm


Patient Instructions/Handouts:  Chest Pain (DC)


Discharge Disposition: HOME SELF-CARE

## 2018-08-17 ENCOUNTER — HOSPITAL ENCOUNTER (OUTPATIENT)
Dept: HOSPITAL 47 - RADUSWWP | Age: 48
Discharge: HOME | End: 2018-08-17
Attending: INTERNAL MEDICINE
Payer: COMMERCIAL

## 2018-08-17 DIAGNOSIS — R93.2: Primary | ICD-10-CM

## 2018-08-17 DIAGNOSIS — R16.0: ICD-10-CM

## 2018-08-17 PROCEDURE — 76700 US EXAM ABDOM COMPLETE: CPT

## 2018-08-31 ENCOUNTER — HOSPITAL ENCOUNTER (OUTPATIENT)
Dept: HOSPITAL 47 - RADNMMAIN | Age: 48
Discharge: HOME | End: 2018-08-31
Attending: INTERNAL MEDICINE
Payer: COMMERCIAL

## 2018-08-31 DIAGNOSIS — K76.0: Primary | ICD-10-CM

## 2018-08-31 DIAGNOSIS — K82.4: ICD-10-CM

## 2018-08-31 PROCEDURE — 78226 HEPATOBILIARY SYSTEM IMAGING: CPT

## 2018-08-31 NOTE — NM
Nuclear medicine hepatobiliary scan.

 

HISTORY: Pain.

 

DOSAGE: The patient received 8 ounces ensure plus and  4.7  mCi of Technetium 99m Choletec.  

 

FINDINGS: There is normal hepatic extraction.  The gallbladder is seen by 15 minutes.  There is bilia
ry to bowel clearance by 60 minutes.  Ejection fraction is 68%.

 

IMPRESSION:

1. Normal hepatobiliary exam

## 2019-06-26 NOTE — US
EXAMINATION TYPE: US abdomen complete

 

DATE OF EXAM: 8/17/2018

 

COMPARISON: NONE

 

CLINICAL HISTORY: R10.13 DYSPEPSIA. NPO, no surgeries, flank pain bilaterally.  

 

EXAM MEASUREMENTS:

 

Liver Length:  20.7 cm   

Gallbladder Wall:  0.1 cm   

CHD:  0.4 cm

Spleen:  12.1 cm   

Right Kidney:  9.7 x 4.4 x 4.7 cm 

Left Kidney:  11.3 x 4.9 x 5.1 cm   

 

 

 

Pancreas: Body and tail obscured by overlying bowel gas

Liver:  Enlarged.  Echogenic with areas of probable focal fatty sparing. 

Gallbladder:  Echogenic lesion seen adjacent to wall -  0.1 x 0.2 cm 

**Evidence for sonographic Alexander's sign:  neg

CBD:  Obscured by overlying bowel gas 

CHD: wnl

Spleen:  wnl   

Right Kidney:  wnl   

Left Kidney:  wnl   

Upper IVC:  wnl  

Abd Aorta:  wnl

 

 

 

The liver is heterogenous and hyperechoic.  The intrahepatic portion of the IVC and proximal abdomina
l aorta are within normal limits.  There is no evidence of cholelithiasis.  Common bile duct is unrem
arkable.  The visualized portions of the pancreas are homogenous.  The spleen is unremarkable.  Kidne
ys are symmetric and free of hydronephrosis.  No renal lesions are seen.

 

IMPRESSION: 

1. Solitary 2 mm cholelith adherent to the gallbladder wall versus gallbladder polyp. Further follow-
up of a size annual surveillance could be recommended. No common bile duct enlargement or evidence of
 acute cholecystitis.

2. Enlarged and heterogenous hepatic echotexture most likely relating to hepatic steatosis with areas
 of focal fatty sparing although correlation with LFTs and serum laboratory values are recommended to
 evaluate for other hepatocellular diseases. Patient's belongings returned

## 2020-08-05 ENCOUNTER — HOSPITAL ENCOUNTER (OUTPATIENT)
Dept: HOSPITAL 47 - LABWHC1 | Age: 50
Discharge: HOME | End: 2020-08-05
Attending: EMERGENCY MEDICINE
Payer: COMMERCIAL

## 2020-08-05 DIAGNOSIS — Z03.818: ICD-10-CM

## 2020-08-05 DIAGNOSIS — Z11.59: Primary | ICD-10-CM
